# Patient Record
Sex: FEMALE | Race: WHITE | NOT HISPANIC OR LATINO | URBAN - METROPOLITAN AREA
[De-identification: names, ages, dates, MRNs, and addresses within clinical notes are randomized per-mention and may not be internally consistent; named-entity substitution may affect disease eponyms.]

---

## 2021-01-06 ENCOUNTER — OUTPATIENT (OUTPATIENT)
Dept: OUTPATIENT SERVICES | Facility: HOSPITAL | Age: 51
LOS: 1 days | End: 2021-01-06

## 2023-02-03 ENCOUNTER — APPOINTMENT (OUTPATIENT)
Dept: OPHTHALMOLOGY | Facility: CLINIC | Age: 53
End: 2023-02-03
Payer: COMMERCIAL

## 2023-02-03 ENCOUNTER — NON-APPOINTMENT (OUTPATIENT)
Age: 53
End: 2023-02-03

## 2023-02-03 PROCEDURE — 99203 OFFICE O/P NEW LOW 30 MIN: CPT

## 2023-02-03 PROCEDURE — 92015 DETERMINE REFRACTIVE STATE: CPT

## 2023-02-24 ENCOUNTER — APPOINTMENT (OUTPATIENT)
Dept: ORTHOPEDIC SURGERY | Facility: CLINIC | Age: 53
End: 2023-02-24
Payer: COMMERCIAL

## 2023-02-24 PROBLEM — Z00.00 ENCOUNTER FOR PREVENTIVE HEALTH EXAMINATION: Status: ACTIVE | Noted: 2023-02-24

## 2023-02-24 PROCEDURE — 73562 X-RAY EXAM OF KNEE 3: CPT | Mod: 50

## 2023-02-24 PROCEDURE — 99204 OFFICE O/P NEW MOD 45 MIN: CPT

## 2023-02-24 RX ORDER — AMOXICILLIN AND CLAVULANATE POTASSIUM 500; 125 MG/1; MG/1
500-125 TABLET, FILM COATED ORAL
Qty: 20 | Refills: 0 | Status: ACTIVE | COMMUNITY
Start: 2023-02-24 | End: 1900-01-01

## 2023-02-24 NOTE — DATA REVIEWED
[MRI] : MRI [Left] : left [Knee] : knee [Report was reviewed and noted in the chart] : The report was reviewed and noted in the chart [I reviewed the films/CD and agree] : I reviewed the films/CD and agree [FreeTextEntry1] : MCL sprain on the femoral side, menisci and cruciates intact

## 2023-02-24 NOTE — DISCUSSION/SUMMARY
[de-identified] : (Assessment)\par \par History, clinical examination and imaging were most consistent with:\par -right knee abrasion\par -left knee prior MCL sprain with healing\par \par The diagnosis was explained in detail. The potential non-surgical and surgical treatments were reviewed. The relative risks and benefits of each option were considered relative to the patient’s age, activity level, medical history, symptom severity and previously attempted treatments. \par \par -Non-surgical treatment was selected. \par -Patient will continue with local wound care and antibiotics were provided prophylactically. Suspicion for cellulitis is low at this time.\par -Recommended that she obtain a Duplex to rule out concomitant DVT due to her calf pain and swelling. If the study is positive she was counseled to seek immediate medical care from her PMD or urgent care center to begin anticoagulation.\par -Follow up in 2 weeks for wound check. If she has focal findings along her joint line we would consider MRi to evaluate for internal derangement. However, current examination is not concerning for meniscal or ligamentous injury.\par -Regarding her left knee, her MCL sprain has healed, and she can return to activity as tolerated. \par \par (Plan)\par \par -Activity modification\par -Augmentin: script provided, take with food, complete the course of antibiotics\par -Ultrasound: script provided, complete as soon as possible\par -Ibuprofen: as needed for pain, GI precautions discussed \par -Follow up: 2 weeks \par \par (MDM) \par \par Problem Complexity\par -Low: acute, uncomplicated injury \par -Moderate: chronic illness with exacerbation\par \par Risk\par -Moderate: prescription medication\par \par -Patient has not been seen by another provider in my practice within the past 2 years who specializes in orthopedic sports medicine, shoulder or elbow surgery. \par \par The patient was advised to consult with their primary medical provider prior to initiation of any new medications to reduce the risk of adverse effects specific to their long-term home medications and medical history. The risk of gastrointestinal irritation and kidney injury specific to long-term NSAID use was discussed.   \par \par

## 2023-02-24 NOTE — IMAGING
[de-identified] : (Knee Examination)\par \par Laterality\par -Right\par \par General\par -In no acute distress: yes\par -Alert and oriented to person, place and time: yes\par -Lymphadenopathy: no\par -Peripheral edema: no\par -Sensation grossly intact to light touch: yes\par -Capillary refill less than 2 seconds: yes \par \par Inspection\par -Skin intact: superficial healing abrasion of the popliteal fossa, minimal erythema, no drainage\par -Swelling: yes of posterior knee and calf\par -Effusion: no\par -Quadriceps atrophy: no\par -Antalgic gait: yes\par \par Tenderness to Palpation\par -Medial joint line: no \par -Lateral joint line: no\par -Medial patellar facet: no\par -Lateral patellar facet: no\par -Superior pole of the patella: no\par -Inferior pole of the patella: no\par -Medial femoral epicondyle: no\par -Medial proximal tibia: no\par -Gerdy’s tubercle: no\par -Proximal calf: yes\par \par Range of Motion\par -Extension: 3\par -Flexion: 120\par \par Strength \par -Extension: 5\par -Flexion: 5\par 	\par Stability\par -Lachman: 1A\par -Anterior drawer: normal\par -Posterior drawer: normal\par -Varus stress: normal\par -Valgus stress: normal \par \par Special Tests\par -Medial Beto: negative \par -Lateral Beto: negative \par -Patellofemoral grind: negative\par -Patellar apprehension: negative\par \par \par (Knee Examination)\par \par Laterality\par -Left\par \par General\par -In no acute distress: yes\par -Alert and oriented to person, place and time: yes\par -Lymphadenopathy: no\par -Peripheral edema: no\par -Sensation grossly intact to light touch: yes\par -Capillary refill less than 2 seconds: yes \par \par Inspection\par -Skin intact: yes\par -Swelling: no\par -Effusion: no\par -Quadriceps atrophy: no\par -Antalgic gait: no\par \par Tenderness to Palpation\par -Medial joint line: no \par -Lateral joint line: no\par -Medial patellar facet: no\par -Lateral patellar facet: no\par -Superior pole of the patella: no\par -Inferior pole of the patella: no\par -Medial femoral epicondyle: no\par -Medial proximal tibia: no\par -Gerdy’s tubercle: no\par -Proximal calf: no\par \par Range of Motion\par -Extension: 0\par -Flexion: 135\par \par Strength \par -Extension: 5\par -Flexion: 5\par 	\par Stability\par -Lachman: 1A\par -Anterior drawer: normal\par -Posterior drawer: normal\par -Varus stress: normal\par -Valgus stress: normal without pain\par \par Special Tests\par -Medial Beto: negative \par -Lateral Beto: negative \par -Patellofemoral grind: negative\par -Patellar apprehension: negative\par  [Bilateral] : knee bilaterally [All Views] : anteroposterior, lateral, skyline, and anteroposterior standing [FreeTextEntry9] : right knee no fracture, DJD or foreign body. left knee with pelligrini steada lesion consistent with old MCL injury

## 2023-02-24 NOTE — HISTORY OF PRESENT ILLNESS
[de-identified] : (New Visit)\par \par Patient Details\par -Age: 52\par -Occupation: School Admin \par -Worker’s compensation claim: no\par -No-fault claim: no\par -School injury claim: no\par \par \par Symptom Details \par -Body part: RT knee\par -Duration of symptoms: since 2/19/23\par -How symptoms began: Slipped and fell off dock and had legs twisted into rope. abrasion to posterior knee. he reports calf pain, trouble walking. reports she had a left knee MCL sprain in june 2022 treated without surgery and also hoping to get a follow up today. the left knee has no pain and normal function\par -Location of pain: posterior \par -Quality of pain: dull\par -Pain score at rest: 3\par -Pain score with activity: 7 \par -Swelling: yes\par -Stiffness: yes \par -Radicular symptoms (numbness or radiating pain down extremity): no\par \par \par Treatment Details\par -Activity modification: yes\par -Medication: tylenol, motrin\par -Physical therapy: no\par -Home exercise program: no\par -Injection: no\par -MRI: LT knee previously\par

## 2023-02-25 ENCOUNTER — RESULT REVIEW (OUTPATIENT)
Age: 53
End: 2023-02-25

## 2023-02-26 ENCOUNTER — FORM ENCOUNTER (OUTPATIENT)
Age: 53
End: 2023-02-26

## 2023-02-26 ENCOUNTER — NON-APPOINTMENT (OUTPATIENT)
Age: 53
End: 2023-02-26

## 2023-02-27 ENCOUNTER — NON-APPOINTMENT (OUTPATIENT)
Age: 53
End: 2023-02-27

## 2023-02-28 ENCOUNTER — NON-APPOINTMENT (OUTPATIENT)
Age: 53
End: 2023-02-28

## 2023-03-09 ENCOUNTER — NON-APPOINTMENT (OUTPATIENT)
Age: 53
End: 2023-03-09

## 2023-03-10 ENCOUNTER — NON-APPOINTMENT (OUTPATIENT)
Age: 53
End: 2023-03-10

## 2023-03-10 ENCOUNTER — APPOINTMENT (OUTPATIENT)
Dept: ORTHOPEDIC SURGERY | Facility: CLINIC | Age: 53
End: 2023-03-10
Payer: COMMERCIAL

## 2023-03-10 DIAGNOSIS — Z78.9 OTHER SPECIFIED HEALTH STATUS: ICD-10-CM

## 2023-03-10 DIAGNOSIS — S80.211A ABRASION, RIGHT KNEE, INITIAL ENCOUNTER: ICD-10-CM

## 2023-03-10 PROCEDURE — 99214 OFFICE O/P EST MOD 30 MIN: CPT

## 2023-03-10 RX ORDER — MELOXICAM 15 MG/1
15 TABLET ORAL
Qty: 90 | Refills: 0 | Status: ACTIVE | COMMUNITY
Start: 2023-03-10 | End: 1900-01-01

## 2023-03-10 NOTE — IMAGING
[de-identified] : (Knee Examination)\par \par Laterality\par -Right\par \par General\par -In no acute distress: yes\par -Alert and oriented to person, place and time: yes\par -Lymphadenopathy: no\par -Peripheral edema: no\par -Sensation grossly intact to light touch: yes\par -Capillary refill less than 2 seconds: yes \par \par Inspection\par -Skin intact: scab formation of the popliteal fossa with granulation tissue, no erythema, no palpable fluid pocket \par -Swelling: yes of posterior knee and calf\par -Effusion: no\par -Quadriceps atrophy: no\par -Antalgic gait: yes\par \par Tenderness to Palpation\par -Medial joint line: no \par -Lateral joint line: yes\par -Medial patellar facet: no\par -Lateral patellar facet: no\par -Superior pole of the patella: no\par -Inferior pole of the patella: no\par -Medial femoral epicondyle: no\par -Medial proximal tibia: no\par -Gerdy’s tubercle: no\par -Proximal calf: yes\par \par Range of Motion\par -Extension: 3\par -Flexion: 130\par \par Strength \par -Extension: 5\par -Flexion: 5\par 	\par Stability\par -Lachman: 1A\par -Anterior drawer: normal\par -Posterior drawer: normal\par -Varus stress: normal\par -Valgus stress: normal \par \par Special Tests\par -Medial Beto: negative \par -Lateral Beto: positive\par -Patellofemoral grind: negative\par -Patellar apprehension: negative

## 2023-03-10 NOTE — DISCUSSION/SUMMARY
[de-identified] : (Assessment)\par \par History, clinical examination and imaging were most consistent with:\par -right knee abrasion with ongoing healing\par -right lateral meniscal tear\par \par The diagnosis was explained in detail. The potential non-surgical and surgical treatments were reviewed. The relative risks and benefits of each option were considered relative to the patient’s age, activity level, medical history, symptom severity and previously attempted treatments. \par \par -Non-surgical treatment was selected. \par -Patient will continue with local wound care. No further antibiotics warranted at this time. She will begin wet to dry dressings to facilitate secondary intention.\par -Meloxicam will be provided for symptom relief. \par -MRI of the knee will be ordered to evaluate for meniscal and/or ligamentous injury. Patient has significant pain and functional limitations. \par -Follow up when imaging completed to discuss further management. We would likely consider PT and possible injection at that time. \par \par \par (MDM) \par \par Problem Complexity\par -Moderate: acute, complicated injury \par \par Risk\par -Moderate: prescription medication\par \par The patient was advised to consult with their primary medical provider prior to initiation of any new medications to reduce the risk of adverse effects specific to their long-term home medications and medical history. The risk of gastrointestinal irritation and kidney injury specific to long-term NSAID use was discussed. \par

## 2023-03-10 NOTE — HISTORY OF PRESENT ILLNESS
[de-identified] : (Follow-Up Visit) \par \par Symptom Details\par -Body part: RT knee \par -Pain score at rest: 8/10\par -Pain score with activity: 10/10\par -Improvement since last visit: still having lateral knee pain. she reports the wound has been healing slowly on her posterior knee. took the abx. negative duplex at [facility]. she is able to walk\par \par Interval Treatment Details\par -Activity modification: yes\par -Medication: tylenol, advil\par -Physical therapy: no\par -Home exercise program: no \par -Injection: no\par -MRI: no\par \par IMPRESSION:\par 1. No evidence of deep vein thrombosis involving either leg.\par 2. Small fluid collection along the right medial knee.\par \par Please refer to previous office visit notes for additional history.\par

## 2023-03-10 NOTE — REASON FOR VISIT
Addended by: REBA AGUIRRE on: 2/26/2020 05:00 PM     Modules accepted: Orders     [FreeTextEntry2] : Rt knee f/u

## 2023-03-11 ENCOUNTER — FORM ENCOUNTER (OUTPATIENT)
Age: 53
End: 2023-03-11

## 2023-03-12 ENCOUNTER — RESULT REVIEW (OUTPATIENT)
Age: 53
End: 2023-03-12

## 2023-03-12 ENCOUNTER — FORM ENCOUNTER (OUTPATIENT)
Age: 53
End: 2023-03-12

## 2023-03-14 ENCOUNTER — APPOINTMENT (OUTPATIENT)
Dept: ORTHOPEDIC SURGERY | Facility: CLINIC | Age: 53
End: 2023-03-14
Payer: COMMERCIAL

## 2023-03-14 DIAGNOSIS — S80.01XA CONTUSION OF RIGHT KNEE, INITIAL ENCOUNTER: ICD-10-CM

## 2023-03-14 DIAGNOSIS — S83.511A SPRAIN OF ANTERIOR CRUCIATE LIGAMENT OF RIGHT KNEE, INITIAL ENCOUNTER: ICD-10-CM

## 2023-03-14 PROCEDURE — 99214 OFFICE O/P EST MOD 30 MIN: CPT

## 2023-03-14 RX ORDER — METHYLPHENIDATE HYDROCHLORIDE 20 MG/1
20 TABLET ORAL
Qty: 60 | Refills: 0 | Status: ACTIVE | COMMUNITY
Start: 2023-02-25

## 2023-03-14 RX ORDER — CONJUGATED ESTROGENS AND MEDROXYPROGESTERONE ACETATE .45; 1.5 MG/1; MG/1
0.45-1.5 TABLET, SUGAR COATED ORAL
Qty: 84 | Refills: 0 | Status: ACTIVE | COMMUNITY
Start: 2022-11-22

## 2023-03-14 RX ORDER — CLONAZEPAM 1 MG/1
1 TABLET ORAL
Qty: 56 | Refills: 0 | Status: ACTIVE | COMMUNITY
Start: 2023-03-09

## 2023-03-14 RX ORDER — METHYLPREDNISOLONE 4 MG/1
4 TABLET ORAL
Qty: 1 | Refills: 0 | Status: ACTIVE | COMMUNITY
Start: 2023-03-14 | End: 1900-01-01

## 2023-03-14 RX ORDER — CYCLOBENZAPRINE HYDROCHLORIDE 5 MG/1
5 TABLET, FILM COATED ORAL 3 TIMES DAILY
Qty: 15 | Refills: 0 | Status: ACTIVE | COMMUNITY
Start: 2023-03-14 | End: 1900-01-01

## 2023-03-14 RX ORDER — GABAPENTIN 400 MG/1
400 CAPSULE ORAL
Qty: 270 | Refills: 0 | Status: ACTIVE | COMMUNITY
Start: 2023-03-02

## 2023-03-14 NOTE — DISCUSSION/SUMMARY
[de-identified] : (Assessment and Plan)\par \par History, clinical examination and imaging were most consistent with:\par -right knee popliteal fossa abrasion with healing\par -right knee medial tibial plateau subchondral fracture, sprain of ACL and MCL, patella cartilage lesion\par \par The diagnosis was explained in detail. The potential non-surgical and surgical treatments were reviewed. The relative risks and benefits of each option were considered relative to the patient’s age, activity level, medical history, symptom severity and previously attempted treatments. \par \par -Non-surgical treatment was selected. \par -Patient will proceed with formal PT.\par -Cortisone injection is discussed and deferred due to the healing fracture. \par -Medrol dose pack will be prescribed for symptom relief. Flexeril will be prescribed as needed for spasms.\par -Patient will obtain over the counter gripper knee brace. A playmaker brace is discussed and deferred due to size. \par -We discussed that her pain is most likely from her bone injuries and it will take time to heal.\par -If she had a primary complaint of instability when pain and swelling resolves, we would reassess her ligamentous ACL injury for possible intervention. \par -Continue wet to dry dressing changes for the abrasion, which has been healing. \par -Follow up in 4 to 6 weeks if symptoms persist or fail to improve. Would consider injection at that time. \par \par \par (MDM) \par \par Problem Complexity\par -Moderate: acute, complicated injury\par \par Risk\par -Moderate: prescription medication\par \par \par The patient was advised to consult with their primary medical provider prior to initiation of any new medications to reduce the risk of adverse effects specific to their long-term home medications and medical history. The risk of gastrointestinal irritation and kidney injury specific to long-term NSAID use was discussed.   \par \par

## 2023-03-14 NOTE — HISTORY OF PRESENT ILLNESS
[de-identified] : (Follow-Up Visit) \par \par Symptom Details\par -Body part: RT knee \par -Pain score at rest: 5/10\par -Pain score with activity: 10/10\par -Improvement since last visit: wound is healing, but still having pain and swelling. did not see improvement with meloxicam. underwent mri \par \par  IMPRESSION:\par * Subchondral fracture of the posterior margin of the medial tibial plateau\par with moderate bone marrow edema.\par * High-grade partial-thickness tear of the anterior cruciate ligament near its\par femoral attachment\par * Grade 1 MCL sprain.\par * Small knee joint effusion with synovitis.\par * 9 mm full-thickness cartilage defect along the median patellar ridge of the\par superior pole of the patella with underlying subchondral cystlike change\par * No evidence of meniscal tear\par \par \par Please refer to previous office visit notes for additional history.\par

## 2023-03-14 NOTE — DATA REVIEWED
[MRI] : MRI [Right] : of the right [Knee] : knee [Report was reviewed and noted in the chart] : The report was reviewed and noted in the chart [I reviewed the films/CD and agree] : I reviewed the films/CD and agree [FreeTextEntry1] : subchondral edema of the medial tibial plateau with subchondral fracture, without extension to the cortical surface, no displacement, moderate effusion, sprain without complete tear of ACL and MCL, menisci intact, full thickness cartilage lesion of the patella with subchondral edema, popliteal fossa edema without fluid collection

## 2023-03-15 ENCOUNTER — APPOINTMENT (OUTPATIENT)
Dept: ORTHOPEDIC SURGERY | Facility: HOSPITAL | Age: 53
End: 2023-03-15

## 2023-04-11 ENCOUNTER — APPOINTMENT (OUTPATIENT)
Dept: ORTHOPEDIC SURGERY | Facility: CLINIC | Age: 53
End: 2023-04-11
Payer: COMMERCIAL

## 2023-04-11 DIAGNOSIS — S83.412S SPRAIN OF MEDIAL COLLATERAL LIGAMENT OF LEFT KNEE, SEQUELA: ICD-10-CM

## 2023-04-11 PROCEDURE — 20610 DRAIN/INJ JOINT/BURSA W/O US: CPT | Mod: RT

## 2023-04-11 PROCEDURE — 99214 OFFICE O/P EST MOD 30 MIN: CPT | Mod: 25

## 2023-04-11 NOTE — DISCUSSION/SUMMARY
[de-identified] : (Assessment and Plan)\par \par History, clinical examination and imaging were most consistent with:\par -right knee medial tibial plateau subchondral fracture, sprain of ACL and MCL, patella cartilage lesion\par \par The diagnosis was explained in detail. The potential non-surgical and surgical treatments were reviewed. The relative risks and benefits of each option were considered relative to the patient’s age, activity level, medical history, symptom severity and previously attempted treatments. \par \par -Non-surgical treatment was selected. \par -Patient will proceed with formal PT.\par -Cortisone injection will be performed for symptom relief. \par -We discussed that her pain is most likely from her bone injuries and it will take time to heal. Her ACL sprain does not appear to be symptomatic. \par -Follow up as needed. \par \par \par (MDM) \par \par Problem Complexity\par -Moderate: chronic illness with exacerbation\par \par Risk\par -Moderate: injection\par \par \par The patient was advised to consult with their primary medical provider prior to initiation of any new medications to reduce the risk of adverse effects specific to their long-term home medications and medical history. The risk of gastrointestinal irritation and kidney injury specific to long-term NSAID use was discussed. \par \par \par (Procedure Note)\par \par Injection location was the:\par -right knee joint\par \par Injection contents were: \par 40 mg of kenalog and 5 mL of 1% lidocaine\par \par Procedure Details: \par -Informed consent was obtained. Discussed possible risks of corticosteroid injection including hyperglycemia, infection and skin discoloration. \par -Discussed that due to infection risk, an interval between injection and any future surgery is 6 weeks for arthroscopy and 3 months for arthroplasty.\par -Injection performed using aseptic technique. Hemostasis was confirmed.\par -Procedure was tolerated well with no complications. \par \par

## 2023-04-11 NOTE — HISTORY OF PRESENT ILLNESS
[de-identified] : (History of Present Illness)\par \par Body part causing symptoms is the RT knee\par Pain score at rest is 3/10\par Pain score during activity is 1/10\par Treatments since last visit include home exercises and bracing\par States the brace was uncomfortable\par She was unable to do PT\par Compared to last visit, symptoms are better. She has more range of motion.\par Still has pain with sitting\par She denies instability

## 2023-04-11 NOTE — IMAGING
[de-identified] : (Knee Examination)\par \par Laterality\par -Right\par \par General\par -In no acute distress: yes\par -Alert and oriented to person, place and time: yes\par -Lymphadenopathy: no\par -Peripheral edema: no\par -Sensation grossly intact to light touch: yes\par -Capillary refill less than 2 seconds: yes \par \par Inspection\par -Skin intact, prior posterior wound is healed\par -Swelling: none\par -Effusion: none\par -Quadriceps atrophy: no\par -Antalgic gait: yes\par \par Tenderness to Palpation\par -Medial joint line: yes\par -Lateral joint line: no\par -Medial patellar facet: no\par -Lateral patellar facet: no\par -Superior pole of the patella: no\par -Inferior pole of the patella: no\par -Medial femoral epicondyle: no\par -Medial proximal tibia: no\par -Gerdy’s tubercle: no\par -Proximal calf: yes\par \par Range of Motion\par -Extension: 0\par -Flexion: 135\par \par Strength \par -Extension: 5\par -Flexion: 5\par 	\par Stability\par -Anterior drawer: normal\par -Posterior drawer: normal\par -Varus stress: normal\par -Valgus stress: normal \par \par Special Tests\par -Medial Beto: negative \par -Lateral Beto: negative\par -Patellofemoral grind: positive\par -Patellar apprehension: negative

## 2023-05-12 ENCOUNTER — APPOINTMENT (OUTPATIENT)
Dept: ORTHOPEDIC SURGERY | Facility: CLINIC | Age: 53
End: 2023-05-12
Payer: COMMERCIAL

## 2023-05-12 DIAGNOSIS — Z78.9 OTHER SPECIFIED HEALTH STATUS: ICD-10-CM

## 2023-05-12 DIAGNOSIS — Z63.5 DISRUPTION OF FAMILY BY SEPARATION AND DIVORCE: ICD-10-CM

## 2023-05-12 DIAGNOSIS — Z86.59 PERSONAL HISTORY OF OTHER MENTAL AND BEHAVIORAL DISORDERS: ICD-10-CM

## 2023-05-12 DIAGNOSIS — Z86.79 PERSONAL HISTORY OF OTHER DISEASES OF THE CIRCULATORY SYSTEM: ICD-10-CM

## 2023-05-12 DIAGNOSIS — F17.200 NICOTINE DEPENDENCE, UNSPECIFIED, UNCOMPLICATED: ICD-10-CM

## 2023-05-12 PROCEDURE — 73030 X-RAY EXAM OF SHOULDER: CPT | Mod: LT

## 2023-05-12 PROCEDURE — 72040 X-RAY EXAM NECK SPINE 2-3 VW: CPT

## 2023-05-12 PROCEDURE — 99214 OFFICE O/P EST MOD 30 MIN: CPT

## 2023-05-12 RX ORDER — CYCLOBENZAPRINE HYDROCHLORIDE 5 MG/1
5 TABLET, FILM COATED ORAL 3 TIMES DAILY
Qty: 15 | Refills: 0 | Status: ACTIVE | COMMUNITY
Start: 2023-05-12 | End: 1900-01-01

## 2023-05-12 RX ORDER — METHYLPREDNISOLONE 4 MG/1
4 TABLET ORAL
Qty: 1 | Refills: 0 | Status: ACTIVE | COMMUNITY
Start: 2023-05-12 | End: 1900-01-01

## 2023-05-12 SDOH — SOCIAL STABILITY - SOCIAL INSECURITY: DISRUPTION OF FAMILY BY SEPARATION AND DIVORCE: Z63.5

## 2023-05-12 NOTE — IMAGING
[de-identified] : (Physical Exam: Shoulder)\par \par Laterality is left\par \par Patient is in no acute distress, alert and oriented\par Sensation is grossly intact to light touch in the hand\par Motor function is 5/5 in the hand\par Capillary refill is less than 2 seconds in the fingers\par Lymphadenopathy is not present\par Peripheral edema is not present\par \par Skin is intact \par Swelling is not present \par Atrophy is not present\par Scapular winging is not present\par Deformity of the AC joint is not present\par Deformity of the biceps is not present \par \par Bicipital groove tenderness is not present \par AC joint tenderness is not present \par Scapulothoracic tenderness is present \par \par Active forward elevation is 175\par Passive forward elevation is 175\par External rotation at the side is 80\par Internal rotation behind the back is to the level of T7 \par \par Forward elevation strength is 5/5\par External rotation strength at the side is 5/5 \par Internal rotation strength at the side is 5/5 \par Deltoid strength of anterior, posterior and lateral heads is 5/5\par \par Reyes test is abnormal \par O’Eros’s test is normal\par Speed’s test is normal\par Cross body adduction test is normal\par Apprehension and relocation is normal\par Sulcus sign is normal\par Belly press test is normal\par \par \par (Physical Exam: Cervical Spine)\par \par Laterality is bilateral\par \par Patient is in no acute distress, alert and oriented\par Capillary refill is less than 2 seconds in the fingers\par Lymphadenopathy is not present\par Peripheral edema is not present\par \par Skin is intact \par Swelling is not present \par Atrophy is not present\par \par Bony tenderness is not present \par Paraspinal tenderness is not present \par Bony stepoff is not present\par \par Range of motion is normal\par \par Shoulder abduction strength is 5/5\par Elbow flexion strength is 5/5\par Elbow extension strength is 5/5\par Wrist flexion strength is 5/5\par Wrist extension strength is 5/5\par Finger abduction strength is 5/5\par \par C5 sensation is intact\par C6 sensation is intact\par C7 sensation is intact\par C8 sensation is intact\par T1 sensation is intact\par \par Biceps reflex is 2+\par Triceps reflex is 2+\par Patella reflex is 2+\par \par Clonus is not present\par Harley's test is not present \par Spurling's test is normal\par \par

## 2023-05-12 NOTE — DISCUSSION/SUMMARY
[de-identified] : \par \par (Assessment and Plan)\par \par History, clinical examination and imaging were most consistent with:\par -cervical radiculopathy\par \par The diagnosis was explained in detail. The potential non-surgical and surgical treatments were reviewed. The relative risks and benefits of each option were considered relative to the patient’s age, activity level, medical history, symptom severity and previously attempted treatments. \par \par -Non-surgical treatment was selected. \par -Patient will proceed with formal PT.\par -Medrol dose pack will be prescribed for symptom relief. Flexeril will be prescribed as needed for spasms.\par -The added clinical utility of an MRI was discussed. The patient deferred further diagnostic testing at this time.\par -Follow up in 6 to 8 weeks if symptoms persist or fail to improve. Consider MRI at that time. \par \par \par (MDM) \par \par Problem Complexity\par -Moderate: chronic illness with exacerbation\par \par Risk\par -Moderate: prescription medication\par \par The patient was advised to consult with their primary medical provider prior to initiation of any new medications to reduce the risk of adverse effects specific to their long-term home medications and medical history. The risk of gastrointestinal irritation and kidney injury specific to long-term NSAID use was discussed.   \par \par

## 2023-06-23 ENCOUNTER — APPOINTMENT (OUTPATIENT)
Dept: ORTHOPEDIC SURGERY | Facility: CLINIC | Age: 53
End: 2023-06-23
Payer: COMMERCIAL

## 2023-06-23 DIAGNOSIS — M54.12 RADICULOPATHY, CERVICAL REGION: ICD-10-CM

## 2023-06-23 PROCEDURE — 99213 OFFICE O/P EST LOW 20 MIN: CPT

## 2023-06-23 NOTE — IMAGING
[de-identified] : (Physical Exam: Cervical Spine)\par \par Laterality is bilateral\par \par Patient is in no acute distress, alert and oriented\par Capillary refill is less than 2 seconds in the fingers\par Lymphadenopathy is not present\par Peripheral edema is not present\par \par Skin is intact \par Swelling is not present \par Atrophy is not present\par \par Bony tenderness is not present \par Paraspinal tenderness is not present \par Bony stepoff is not present\par \par Range of motion is normal\par \par Shoulder abduction strength is 5/5\par Elbow flexion strength is 5/5\par Elbow extension strength is 5/5\par Wrist flexion strength is 5/5\par Wrist extension strength is 5/5\par Finger abduction strength is 5/5\par \par C5 sensation is intact\par C6 sensation is intact\par C7 sensation is intact\par C8 sensation is intact\par T1 sensation is intact\par \par Biceps reflex is 2+\par Triceps reflex is 2+\par Patella reflex is 2+\par \par Clonus is not present\par Harley's test is not present \par Spurling's test is normal\par \par

## 2023-06-23 NOTE — DISCUSSION/SUMMARY
[de-identified] : (Assessment and Plan)\par \par History, clinical examination and imaging were most consistent with:\par -cervical radiculopathy\par \par The diagnosis was explained in detail. The potential non-surgical and surgical treatments were reviewed. The relative risks and benefits of each option were considered relative to the patient’s age, activity level, medical history, symptom severity and previously attempted treatments. \par \par -Given the persistence of symptoms, an MRI of the cervical spine will be ordered.\par -Patient has an appointment with pain management specialist in 2 weeks to discuss possible injection.\par -Tylenol and advil as needed for pain.\par -Follow up with me as needed. \par \par (MDM) \par \par Problem Complexity\par -Moderate: chronic illness with exacerbation\par \par Risk\par -Low: over the counter medication\par \par The patient was advised to consult with their primary medical provider prior to initiation of any new medications to reduce the risk of adverse effects specific to their long-term home medications and medical history. The risk of gastrointestinal irritation and kidney injury specific to long-term NSAID use was discussed.   \par \par

## 2023-06-23 NOTE — HISTORY OF PRESENT ILLNESS
[] : no [de-identified] : (Follow-Up Visit) \par \par (History of Present Illness) \par \par Body part causing symptoms is the cervical spine\par Pain score at rest is 4/10\par Pain score during activity is 3/10\par Treatments since last visit include HEP, flexeril, MDP\par Compared to last visit, symptoms are improvement in pain but has persistent tingling in her left arm\par She is doing a home exercise program\par \par \par

## 2023-07-06 ENCOUNTER — APPOINTMENT (OUTPATIENT)
Dept: PAIN MANAGEMENT | Facility: CLINIC | Age: 53
End: 2023-07-06

## 2024-02-16 ENCOUNTER — APPOINTMENT (OUTPATIENT)
Dept: OPHTHALMOLOGY | Facility: CLINIC | Age: 54
End: 2024-02-16

## 2024-03-01 ENCOUNTER — APPOINTMENT (OUTPATIENT)
Dept: OPHTHALMOLOGY | Facility: CLINIC | Age: 54
End: 2024-03-01
Payer: COMMERCIAL

## 2024-03-01 ENCOUNTER — NON-APPOINTMENT (OUTPATIENT)
Age: 54
End: 2024-03-01

## 2024-03-01 ENCOUNTER — APPOINTMENT (OUTPATIENT)
Dept: OPHTHALMOLOGY | Facility: CLINIC | Age: 54
End: 2024-03-01
Payer: SELF-PAY

## 2024-03-01 PROCEDURE — 92015 DETERMINE REFRACTIVE STATE: CPT

## 2024-03-01 PROCEDURE — 92014 COMPRE OPH EXAM EST PT 1/>: CPT

## 2025-03-19 ENCOUNTER — APPOINTMENT (OUTPATIENT)
Dept: OPHTHALMOLOGY | Facility: CLINIC | Age: 55
End: 2025-03-19
Payer: COMMERCIAL

## 2025-03-19 ENCOUNTER — NON-APPOINTMENT (OUTPATIENT)
Age: 55
End: 2025-03-19

## 2025-03-19 PROCEDURE — 99213 OFFICE O/P EST LOW 20 MIN: CPT
